# Patient Record
Sex: FEMALE | Race: BLACK OR AFRICAN AMERICAN
[De-identification: names, ages, dates, MRNs, and addresses within clinical notes are randomized per-mention and may not be internally consistent; named-entity substitution may affect disease eponyms.]

---

## 2017-03-13 ENCOUNTER — HOSPITAL ENCOUNTER (EMERGENCY)
Dept: HOSPITAL 17 - NEPA | Age: 22
LOS: 1 days | Discharge: HOME | End: 2017-03-14
Payer: COMMERCIAL

## 2017-03-13 VITALS
SYSTOLIC BLOOD PRESSURE: 139 MMHG | HEART RATE: 81 BPM | OXYGEN SATURATION: 99 % | TEMPERATURE: 98.5 F | DIASTOLIC BLOOD PRESSURE: 82 MMHG | RESPIRATION RATE: 16 BRPM

## 2017-03-13 VITALS — WEIGHT: 149.91 LBS | HEIGHT: 61 IN | BODY MASS INDEX: 28.3 KG/M2

## 2017-03-13 DIAGNOSIS — N76.0: Primary | ICD-10-CM

## 2017-03-13 PROCEDURE — 87210 SMEAR WET MOUNT SALINE/INK: CPT

## 2017-03-13 PROCEDURE — 81001 URINALYSIS AUTO W/SCOPE: CPT

## 2017-03-13 PROCEDURE — 87491 CHLMYD TRACH DNA AMP PROBE: CPT

## 2017-03-13 PROCEDURE — 99283 EMERGENCY DEPT VISIT LOW MDM: CPT

## 2017-03-13 PROCEDURE — 84703 CHORIONIC GONADOTROPIN ASSAY: CPT

## 2017-03-13 PROCEDURE — 87591 N.GONORRHOEAE DNA AMP PROB: CPT

## 2017-03-13 PROCEDURE — 96372 THER/PROPH/DIAG INJ SC/IM: CPT

## 2017-03-13 NOTE — PD
HPI


Chief Complaint:  Abdominal Pain


Time Seen by Provider:  23:48


Travel History


International Travel<30 days:  No


Contact w/Intl Traveler<30days:  No


Traveled to known affect area:  No





History of Present Illness


HPI


22-year-old female here for evaluation of lower back pain.  The patient reports 

having lower back pain for a month, however the pain felt worse today and was 

sharp, so she decided to present to the emergency department for evaluation.  

She has not seen anyone for her back pain in the last month.  She reports heavy 

lifting at work.  No urinary symptoms.  No vaginal bleeding or discharge.  She 

does not believe she is pregnant.  Pain is moderate, midline, worse with 

movement and palpation.  She denies fevers or chills.  No history of IVDU.  No 

bowel or bladder retention or incontinence.  She is currently on her menstrual 

period.





[facility]


Past Medical History


Medical History:  Denies Significant Hx


Diminished Hearing:  No


Tetanus Vaccination:  < 5 Years


Influenza Vaccination:  No


Pregnant?:  Not Pregnant


LMP:  NOW


:  1


Para:  1





Past Surgical History


Surgical History:  No Previous Surgery





Social History


Alcohol Use:  No


Tobacco Use:  No


Substance Use:  No





Allergies-Medications


(Allergen,Severity, Reaction):  


Coded Allergies:  


     No Known Allergies (Verified , 3/13/17)


Reported Meds & Prescriptions





Reported Meds & Active Scripts


Active


No Active Prescriptions or Reported Medications    








Review of Systems


Except as stated in HPI:  all other systems reviewed are Neg





Physical Exam


Narrative


GENERAL: Well-developed, well-nourished, overweight, comfortable, no acute 

distress.


SKIN: Warm and dry.  No rash.


HEAD: Atraumatic. Normocephalic. 


EYES: Pupils equal and round. No scleral icterus. No injection or drainage. 


ENT: Mucous membranes pink and moist.


NECK: Trachea midline. No JVD.  No midline cervical spine step-off or 

tenderness.


CARDIOVASCULAR: Regular rate and rhythm. 


RESPIRATORY: No accessory muscle use. Clear to auscultation. Breath sounds 

equal bilaterally. 


GASTROINTESTINAL: Abdomen soft, non-tender, nondistended. 


GYN:  Exam performed in the presence of a female nurse.  Normal external 

genitalia.  Scant blood in vaginal vault coming from vaginal os.  No vaginal or 

cervical lacerations.  Normal cervix.  No CMT.  No adnexal masses or tenderness.


MUSCULOSKELETAL: No obvious deformities. No clubbing.  No cyanosis.  No edema.  

No CVA tenderness.  Mild midline lumbar spine tenderness without step-off.


NEUROLOGICAL: Awake and alert. No obvious cranial nerve deficits.  Motor 

grossly within normal limits. Normal speech.


PSYCHIATRIC: Appropriate mood and affect; insight and judgment normal.





Data


Data


Last Documented VS





Vital Signs








  Date Time  Temp Pulse Resp B/P Pulse Ox O2 Delivery O2 Flow Rate FiO2


 


3/13/17 23:01 98.5 81 16 139/82 99 Room Air  








Orders





 Urinalysis - C+S If Indicated (3/13/17 23:50)


Ed Urine Pregnancytest Poc (3/13/17 23:50)


Gc And Chlamydia Pcr (3/13/17 23:50)


Wet Prep Profile (3/13/17 23:50)


Metronidazole (Flagyl) (3/14/17 00:30)


Ketorolac Inj (Toradol Inj) (3/14/17 00:30)





Labs








 Laboratory Tests








Test 3/13/17 3/13/17





 23:50 23:59


 


Urine Color YELLOW  


 


Urine Turbidity CLEAR  


 


Urine pH 6.0  


 


Urine Specific Gravity 1.028  


 


Urine Protein TRACE mg/dL 


 


Urine Glucose (UA) NEG mg/dL 


 


Urine Ketones NEG mg/dL 


 


Urine Occult Blood LARGE  


 


Urine Nitrite NEG  


 


Urine Bilirubin NEG  


 


Urine Urobilinogen 4.0 MG/DL 


 


Urine Leukocyte Esterase NEG  


 


Urine RBC  /hpf 


 


Urine WBC 2 /hpf 


 


Urine Squamous Epithelial 1 /hpf 





Cells  


 


Urine Mucus FEW /lpf 


 


Microscopic Urinalysis Comment CULT NOT 





 INDICATED 


 


Clue Cells (Wet Prep)  PRESENT 


 


Vaginal Trichomonas (Wet Prep)  NONE SEEN 


 


Vaginal Yeast (Wet Prep)  NONE SEEN 














MDM


Medical Decision Making


Medical Screen Exam Complete:  Yes


Emergency Medical Condition:  Yes


Differential Diagnosis


Musculoskeletal low back pain, UTI, pyelonephritis, BV, Trichomonas


Narrative Course


Vital signs show heart rate 81, blood pressure 139/82, pulse ox 99% on room air

, oral temp of 98.5F.





Urine pregnancy is negative





Wet prep is positive for clue cells, negative for yeast, negative for 

Trichomonas.





The patient is complaining of midline lower back pain.  She has no red flags 

for low back pain.  No focal neurologic deficits.  Normal muscle strength in 

bilateral lower extremities.


Patient is currently on her menstrual period accounting for hematuria.  This 

could also be causing her lower back pain.  Also bacterial vaginosis could be 

causing her lower back pain.  Signs and symptoms are not consistent with cord 

compression.  She will be given Toradol here and will be started on Flagyl 500 

mg twice a day.  PMD follow-up this week.  She was informed on when to return 

to the emergency department.  She verbalizes understanding and agreement with 

plan.





Diagnosis





 Primary Impression:  


 Bacterial vaginosis


 Additional Impression:  


 Low back pain


 Qualified Code:  M54.5 - Midline low back pain without sciatica, unspecified 

chronicity


Referrals:  


[facility]


3 days





***Additional Instructions:


Follow-up with a gynecologist or in [facility] this week.


Take antibiotic as prescribed.


Return to the emergency department for worsening symptoms or any other concerns.


Scripts


Naproxen 500 Mg [id number] Mg PO BID  10 Days  Ref 0


   Prov:Bull Mike MD         3/14/17 


Metronidazole (Flagyl)500 Mg [id number] Mg PO BID  7 Days  Ref 0


   Prov:Bull Mike MD         3/14/17


Disposition:  01 DISCHARGE HOME


Condition:  Stable








Bull Mike MD Mar 13, 2017 23:53

## 2017-03-14 LAB
CHLAMYDIA PCR: NOT DETECTED
COLOR UR: YELLOW
COMMENT (UR): (no result)
CULTURE IF INDICATED: (no result)
GLUCOSE UR STRIP-MCNC: (no result) MG/DL
HGB UR QL STRIP: (no result)
KETONES UR STRIP-MCNC: (no result) MG/DL
MUCOUS THREADS #/AREA URNS LPF: (no result) /LPF
NEISSERIA PCR: DETECTED
NITRITE UR QL STRIP: (no result)
SP GR UR STRIP: 1.03 (ref 1–1.03)
SQUAMOUS #/AREA URNS HPF: 1 /HPF (ref 0–5)

## 2017-03-16 ENCOUNTER — HOSPITAL ENCOUNTER (EMERGENCY)
Dept: HOSPITAL 17 - NEPA | Age: 22
Discharge: HOME | End: 2017-03-16
Payer: COMMERCIAL

## 2017-03-16 VITALS — WEIGHT: 154.32 LBS | BODY MASS INDEX: 29.14 KG/M2 | HEIGHT: 61 IN

## 2017-03-16 VITALS
SYSTOLIC BLOOD PRESSURE: 120 MMHG | OXYGEN SATURATION: 100 % | HEART RATE: 74 BPM | RESPIRATION RATE: 20 BRPM | TEMPERATURE: 98 F | DIASTOLIC BLOOD PRESSURE: 84 MMHG

## 2017-03-16 DIAGNOSIS — B96.89: ICD-10-CM

## 2017-03-16 DIAGNOSIS — N73.8: Primary | ICD-10-CM

## 2017-03-16 DIAGNOSIS — A54.9: ICD-10-CM

## 2017-03-16 PROCEDURE — 96372 THER/PROPH/DIAG INJ SC/IM: CPT

## 2017-03-16 PROCEDURE — 99283 EMERGENCY DEPT VISIT LOW MDM: CPT

## 2017-03-16 NOTE — PD
HPI


Chief Complaint:  Gyn Problem/Complaint


Time Seen by Provider:  11:57


Travel History


International Travel<30 days:  No


Contact w/Intl Traveler<30days:  No


Traveled to known affect area:  No





History of Present Illness


HPI


32-year-old female complains of low back pain and vaginal spotting.  Patient 

was seen in emergency room 2 days ago low back pain and vaginal bleeding.  

Patient was given prescription for Flagyl.  Patient has been taking Flagyl as 

directed.  Gonorrhea PCR result came back positive.  Patient was advised to 

come back to the ED for evaluation.  Patient states that she still had 

persistent low back pain with some vaginal spotting.  Patient denies any 

vaginal discharge.  Patient denies dysuria or frequency.  Patient denies any 

fever chills.





[facility]


Past Medical History


Medical History:  Denies Significant Hx


Diminished Hearing:  No


Influenza Vaccination:  No


Pregnant?:  Not Pregnant


LMP:  11/15/17


:  1


Para:  1





Past Surgical History


Surgical History:  No Previous Surgery





Social History


Alcohol Use:  No


Tobacco Use:  No


Substance Use:  No





Allergies-Medications


(Allergen,Severity, Reaction):  


Coded Allergies:  


     No Known Allergies (Verified , 3/13/17)


Reported Meds & Prescriptions





Reported Meds & Active Scripts


Active


Naproxen 500 Mg Tab 500 Mg PO BID 10 Days


Flagyl (Metronidazole) 500 Mg Tab 500 Mg PO BID 7 Days








Review of Systems


General / Constitutional:  No: Fever


Eyes:  No: Visual changes


HENT:  No: Headaches


Cardiovascular:  No: Chest Pain or Discomfort


Respiratory:  No: Shortness of Breath


Gastrointestinal:  No: Abdominal Pain


Genitourinary:  No: Dysuria


Musculoskeletal:  No: Pain


Skin:  No Rash


Neurologic:  No: Weakness


Psychiatric:  No: Depression


Endocrine:  No: Polydipsia


Hematologic/Lymphatic:  No: Easy Bruising





Physical Exam


Narrative


GENERAL: Well-nourished, well-developed patient.


SKIN: Warm and dry.


HEAD: Normocephalic.


EYES: No scleral icterus. No injection or drainage. 


NECK: Supple, trachea midline. No JVD or lymphadenopathy.


CARDIOVASCULAR: Regular rate and rhythm without murmurs, gallops, or rubs. 


RESPIRATORY: Breath sounds equal bilaterally. No accessory muscle use.


GASTROINTESTINAL: Abdomen soft, non-tender, nondistended. 


MUSCULOSKELETAL: No cyanosis, or edema. 


BACK: Mild tenderness on palpation lumbar area, without obvious deformity. No 

CVA tenderness.





Data


Data


Last Documented VS





Vital Signs








  Date Time  Temp Pulse Resp B/P Pulse Ox O2 Delivery O2 Flow Rate FiO2


 


3/16/17 11:18  88 17     


 


3/16/17 11:03 98.0   120/84 100 Room Air  








Orders





 Azithromycin Powd Pack (Zithromax Powd P (3/16/17 12:15)


Rocephin 250mg Vial Im X 1 (3/16/17 12:15)


Lidocaine 1% Inj (50 Ml) (Xylocaine 1% I (3/16/17 12:15)








MDM


Medical Decision Making


Medical Screen Exam Complete:  Yes


Emergency Medical Condition:  Yes


Medical Record Reviewed:  Yes


Differential Diagnosis


Differential diagnosis including cervicitis, PID


Narrative Course


32-year-old female back pain and positive gonorrhea PCR.  Rocephin 250 mg IM.  

Zithromax 1 g by mouth.





Diagnosis





 Primary Impression:  


 PID (acute pelvic inflammatory disease)


Patient Instructions:  General Instructions





***Additional Instructions:


Continue with Flagyl as directed.  Doxycycline as directed.  Follow-up with 

personal physician.  Advised partner to be treated also.


***Med/Other Pt SpecificInfo:  Prescription(s) given


Scripts


Doxycycline Hyclate 100 Mg [id number] Mg PO BID  #14 TAB


   Prov:Suleman Sanchez MD         3/16/17


Disposition:  01 DISCHARGE HOME


Condition:  Stable








Suleman Sanchez MD Mar 16, 2017 12:26

## 2018-02-08 ENCOUNTER — HOSPITAL ENCOUNTER (EMERGENCY)
Dept: HOSPITAL 17 - NED | Age: 23
Discharge: LEFT BEFORE DISCHARGE | End: 2018-02-08
Payer: COMMERCIAL

## 2018-02-08 VITALS — BODY MASS INDEX: 33.11 KG/M2 | WEIGHT: 175.38 LBS | HEIGHT: 61 IN

## 2018-02-08 VITALS
HEART RATE: 89 BPM | DIASTOLIC BLOOD PRESSURE: 75 MMHG | OXYGEN SATURATION: 100 % | TEMPERATURE: 98.4 F | RESPIRATION RATE: 16 BRPM | SYSTOLIC BLOOD PRESSURE: 126 MMHG

## 2018-02-08 DIAGNOSIS — R05: Primary | ICD-10-CM

## 2018-02-08 PROCEDURE — 99281 EMR DPT VST MAYX REQ PHY/QHP: CPT

## 2018-02-10 NOTE — PD
Physical Exam


Date Seen by Provider:  Feb 8, 2018


Time Seen by Provider:  19:10


Narrative


22-year-old female presents to the emergency department for evaluation of body 

aches, cough for 3 days.  Current pain is 2/10.





Data


Data


Last Documented VS





Vital Signs








  Date Time  Temp Pulse Resp B/P (MAP) Pulse Ox O2 Delivery O2 Flow Rate FiO2


 


2/8/18 19:05 98.4 89 16 126/75 (92) 100 Room Air  











MDM


Supervised Visit with ADE:  No


Narrative Course


22-year-old female presents to the emergency department for evaluation of cold 

symptoms for 3 days.  Patient initially seen in triage.  She left AGAINST 

MEDICAL ADVICE before she could be moved to medical bed.


Diagnosis





 Primary Impression:  


 Left against medical advice


Disposition:  07 AGAINST MEDICAL ADVICE











Anastacia Walsh Feb 10, 2018 13:44

## 2018-04-07 ENCOUNTER — HOSPITAL ENCOUNTER (EMERGENCY)
Dept: HOSPITAL 17 - PHED | Age: 23
Discharge: HOME | End: 2018-04-07
Payer: COMMERCIAL

## 2018-04-07 VITALS
HEART RATE: 81 BPM | RESPIRATION RATE: 20 BRPM | DIASTOLIC BLOOD PRESSURE: 78 MMHG | TEMPERATURE: 99.9 F | SYSTOLIC BLOOD PRESSURE: 127 MMHG

## 2018-04-07 VITALS — SYSTOLIC BLOOD PRESSURE: 113 MMHG | DIASTOLIC BLOOD PRESSURE: 78 MMHG | TEMPERATURE: 97.8 F

## 2018-04-07 DIAGNOSIS — M54.5: Primary | ICD-10-CM

## 2018-04-07 LAB
COLOR UR: (no result)
GLUCOSE UR STRIP-MCNC: (no result) MG/DL
HGB UR QL STRIP: (no result)
KETONES UR STRIP-MCNC: (no result) MG/DL
NITRITE UR QL STRIP: (no result)
SP GR UR STRIP: (no result) (ref 1–1.03)
SQUAMOUS #/AREA URNS HPF: (no result) /HPF (ref 0–5)
URINE LEUKOCYTE ESTERASE: (no result)

## 2018-04-07 PROCEDURE — 81001 URINALYSIS AUTO W/SCOPE: CPT

## 2018-04-07 PROCEDURE — 99283 EMERGENCY DEPT VISIT LOW MDM: CPT

## 2018-04-07 NOTE — PD
HPI


Chief Complaint:  Flank/Kidney Pain


Time Seen by Provider:  20:08


Travel History


International Travel<30 days:  No


Contact w/Intl Traveler<30days:  No


Traveled to known affect area:  No





History of Present Illness


HPI


Patient is a 23-year-old female presents emergency department for evaluation of 

right gluteal pain.  Patient states this is gone on intermittently for the past 

week or so.  States when it comes on very severe and sharp and right over her 

low back and the right gluteus, denies any nausea vomiting abdominal pain 

diarrhea constipation vaginal bleeding or vaginal discharge.  She denies any 

saddle anesthesia, denies any difficulty with urination.  She states currently 

the pain is nonexistent.  She cannot think of any particular action which 

brings along her pain.  States her pain is intermittent, for the past week, 

associated signs and symptoms as above, quality as above





PFSH


Past Medical History


Medical History:  Denies Significant Hx


Diminished Hearing:  No


Influenza Vaccination:  No


Pregnant?:  Not Pregnant


LMP:  2018


:  1


Para:  1





Past Surgical History


Surgical History:  No Previous Surgery





Social History


Alcohol Use:  No


Tobacco Use:  No


Substance Use:  No





Allergies-Medications


(Allergen,Severity, Reaction):  


Coded Allergies:  


     No Known Allergies (Verified , 3/13/17)


Reported Meds & Prescriptions





Reported Meds & Active Scripts


Active


Naproxen 500 Mg Tab 500 Mg PO BID 10 Days








Review of Systems


Except as stated in HPI:  all other systems reviewed are Neg





Physical Exam


Narrative


GENERAL: Well-developed well-nourished no obvious distress


SKIN: Warm and dry.


HEAD: Normocephalic.  Atrial


EYES: No scleral icterus. No injection or drainage. 


NECK: Supple, trachea midline. No JVD or lymphadenopathy.


CARDIOVASCULAR: Regular rate and rhythm without murmurs, gallops, or rubs. 


RESPIRATORY: Breath sounds equal bilaterally. No accessory muscle use.


GASTROINTESTINAL: Abdomen soft, non-tender, nondistended.  No rebound no 

percussive tenderness.


MUSCULOSKELETAL: No cyanosis, or edema.  Area with the patient identifies the 

pain has been coming on his right over the sacroiliac joint, there is no 

tenderness there now, no midline CT or L-spine tenderness, pelvis stable,


BACK: Nontender without obvious deformity. No CVA tenderness.








Data


Data


Last Documented VS





Vital Signs








  Date Time  Temp Pulse Resp B/P (MAP) Pulse Ox O2 Delivery O2 Flow Rate FiO2


 


18 21:06 97.8 78 18 113/78 (90) 97   








Orders





 Orders


Urinalysis - C+S If Indicated (18 20:28)


Ed Discharge Order (18 20:53)





Labs





Laboratory Tests








Test


  18


20:35


 


Urine Collection Type CLEAN CATCH 


 


Urine Color OTHER 


 


Urine Turbidity CLEAR 


 


Urine pH 6.0 


 


Urine Specific Gravity


  LESS/EQUAL


1.005


 


Urine Protein NEG mg/dL 


 


Urine Glucose (UA) NEG mg/dL 


 


Urine Ketones NEG mg/dL 


 


Urine Occult Blood TRACE 


 


Urine Nitrite NEG 


 


Urine Bilirubin NEG 


 


Urine Urobilinogen 0.2 MG/DL 


 


Urine Leukocyte Esterase NEG 


 


Urine Squamous Epithelial


Cells 0-5 /hpf 


 


 


Microscopic Urinalysis Comment


  CULT NOT


INDICATED











MDM


Medical Decision Making


Medical Screen Exam Complete:  Yes


Emergency Medical Condition:  Yes


Differential Diagnosis


Sacroiliitis, low back pain, low back strain, fracture unlikely.


Narrative Course


Patient room to the emergency department, currently pain-free discussed the 

possibility of sacroiliitis with this patient.  Discussed symptomatic 

management follow-up with a primary care physician and return to ED criteria.  

There is no indication further workup of this patient at this time.  UA negative

, urine pregnancy test was negative in the emergency department





Diagnosis





 Primary Impression:  


 Low back pain


 Qualified Codes:  M54.5 - Low back pain


Patient Instructions:  General Instructions, Sacroiliitis (ED)


***Med/Other Pt SpecificInfo:  Prescription(s) given


Scripts


Naproxen (Naproxen) 500 Mg Tab


500 MG PO BID for 10 Days, TAB 0 Refills


   Prov: Milton Clarke MD         18


Disposition:  01 DISCHARGE HOME


Condition:  Stable











Milton Clarke MD 2018 20:53